# Patient Record
Sex: MALE | Race: ASIAN | NOT HISPANIC OR LATINO | Employment: UNEMPLOYED | URBAN - NONMETROPOLITAN AREA
[De-identification: names, ages, dates, MRNs, and addresses within clinical notes are randomized per-mention and may not be internally consistent; named-entity substitution may affect disease eponyms.]

---

## 2018-11-30 ENCOUNTER — OFFICE VISIT (OUTPATIENT)
Dept: URGENT CARE | Facility: PHYSICIAN GROUP | Age: 73
End: 2018-11-30
Payer: MEDICARE

## 2018-11-30 VITALS
SYSTOLIC BLOOD PRESSURE: 142 MMHG | RESPIRATION RATE: 16 BRPM | DIASTOLIC BLOOD PRESSURE: 84 MMHG | WEIGHT: 165 LBS | OXYGEN SATURATION: 97 % | TEMPERATURE: 97.7 F | HEART RATE: 92 BPM

## 2018-11-30 DIAGNOSIS — R12 HEARTBURN: ICD-10-CM

## 2018-11-30 DIAGNOSIS — R94.31 EKG ABNORMALITIES: ICD-10-CM

## 2018-11-30 PROCEDURE — 99205 OFFICE O/P NEW HI 60 MIN: CPT | Performed by: NURSE PRACTITIONER

## 2018-11-30 PROCEDURE — 93000 ELECTROCARDIOGRAM COMPLETE: CPT | Performed by: NURSE PRACTITIONER

## 2018-11-30 RX ORDER — ASPIRIN 325 MG
325 TABLET ORAL ONCE
Status: COMPLETED | OUTPATIENT
Start: 2018-11-30 | End: 2018-11-30

## 2018-11-30 RX ORDER — ATORVASTATIN CALCIUM 40 MG/1
40 TABLET, FILM COATED ORAL NIGHTLY
COMMUNITY

## 2018-11-30 RX ORDER — PIOGLITAZONEHYDROCHLORIDE 30 MG/1
30 TABLET ORAL DAILY
COMMUNITY

## 2018-11-30 RX ORDER — SOLIFENACIN SUCCINATE 10 MG/1
10 TABLET, FILM COATED ORAL DAILY
COMMUNITY

## 2018-11-30 RX ADMIN — Medication 325 MG: at 18:46

## 2018-11-30 ASSESSMENT — ENCOUNTER SYMPTOMS
FEVER: 0
BLOOD IN STOOL: 0
ABDOMINAL PAIN: 0
HEADACHES: 0
PALPITATIONS: 0
FLANK PAIN: 0
CONSTIPATION: 0
CLAUDICATION: 0
SHORTNESS OF BREATH: 1
DIARRHEA: 0
CHILLS: 0
VOMITING: 0
PND: 0
DIZZINESS: 0
WHEEZING: 1
ORTHOPNEA: 0
MYALGIAS: 0
HEARTBURN: 1
BACK PAIN: 0
NAUSEA: 0
NECK PAIN: 0

## 2018-12-01 NOTE — PROGRESS NOTES
Subjective:   Wilber Noland is a 73 y.o. male who presents for Abdominal Pain (x1mos)        HPI   Patient presents with new onset upper abdominal pain that started a month ago. He states the pain is constant and feels like heartburn. He has taken several brands of OTC antacids without any relief. He denies chest pain, but states that while walking he becomes short of breath. Denies alleviating or aggravating factors.  He states the pain is not associated with meals or eating. He states that he feels full at times.  Patient with history of cardiac stent and HTN.    Denies recurrent NSAID use or gastric issues in the past.    Review of Systems   Constitutional: Negative for chills, fever and malaise/fatigue.   Respiratory: Positive for shortness of breath and wheezing.    Cardiovascular: Negative for chest pain, palpitations, orthopnea, claudication, leg swelling and PND.   Gastrointestinal: Positive for heartburn. Negative for abdominal pain, blood in stool, constipation, diarrhea, melena, nausea and vomiting.   Genitourinary: Negative for dysuria, flank pain, frequency, hematuria and urgency.   Musculoskeletal: Negative for back pain, myalgias and neck pain.   Neurological: Negative for dizziness and headaches.   All other systems reviewed and are negative.    No Known Allergies   PMH:  has a past medical history of Diabetes (HCC); HTN (hypertension); Hyperlipidemia; Hypertension; and Overactive bladder.  MEDS:   Current Outpatient Prescriptions:   •  OLMESARTAN MEDOXOMIL PO, Take  by mouth., Disp: , Rfl:   •  metformin (GLUCOPHAGE) 1000 MG tablet, Take 1,000 mg by mouth 2 times a day, with meals., Disp: , Rfl:   •  pioglitazone (ACTOS) 30 MG Tab, Take 30 mg by mouth every day., Disp: , Rfl:   •  atorvastatin (LIPITOR) 40 MG Tab, Take 40 mg by mouth every evening., Disp: , Rfl:   •  solifenacin (VESICARE) 10 MG tablet, Take 10 mg by mouth every day., Disp: , Rfl:   ALLERGIES: No Known Allergies  SURGHX: History  reviewed. No pertinent surgical history.  SOCHX:  reports that he has been smoking Cigarettes.  He has never used smokeless tobacco. He reports that he does not drink alcohol or use drugs.  FH: Family history was reviewed, no pertinent findings to report     Objective:   /84   Pulse 92   Temp 36.5 °C (97.7 °F) (Temporal)   Resp 16   Wt 74.8 kg (165 lb)   SpO2 97%   Physical Exam   Constitutional: He is oriented to person, place, and time. He appears well-developed and well-nourished.   HENT:   Right Ear: Hearing normal.   Left Ear: Hearing normal.   Mouth/Throat: Oropharynx is clear and moist and mucous membranes are normal.   Eyes: Pupils are equal, round, and reactive to light. Conjunctivae are normal.   Cardiovascular: Normal rate, regular rhythm and normal heart sounds.    No murmur heard.  Pulmonary/Chest: Effort normal and breath sounds normal. No respiratory distress.   Wheezing upon laying patient down for abdominal exam   Abdominal: Soft. Bowel sounds are normal. He exhibits no distension and no ascites. There is no hepatosplenomegaly. There is tenderness in the epigastric area. There is no CVA tenderness.   Neurological: He is alert and oriented to person, place, and time.   Skin: Skin is warm and dry. Capillary refill takes less than 2 seconds.   Psychiatric: He has a normal mood and affect. His behavior is normal. Judgment and thought content normal.   Vitals reviewed.        Assessment/Plan:   Assessment    1. Heartburn  - EKG - Clinic Performed  -  AMA/Refusal of Treatment  - aspirin (ASA) tablet 325 mg; Take 1 Tab by mouth Once.    2. EKG abnormalities    Other orders  - OLMESARTAN MEDOXOMIL PO; Take  by mouth.  - metformin (GLUCOPHAGE) 1000 MG tablet; Take 1,000 mg by mouth 2 times a day, with meals.  - pioglitazone (ACTOS) 30 MG Tab; Take 30 mg by mouth every day.  - atorvastatin (LIPITOR) 40 MG Tab; Take 40 mg by mouth every evening.  - solifenacin (VESICARE) 10 MG tablet; Take 10 mg  by mouth every day.    EKG shows ST elevation in V1-V4    Patient given aspirin in office    Patient's daughter states that she will drive him to [facility] in [place]. Discussed risks and patient signed AMA form.    Called [facility] and spoke tp Dr. Bob To; aware patient is arriving    Differential diagnosis, natural history, supportive care, and indications for immediate follow-up discussed.